# Patient Record
Sex: FEMALE | Race: WHITE | NOT HISPANIC OR LATINO | Employment: FULL TIME | URBAN - NONMETROPOLITAN AREA
[De-identification: names, ages, dates, MRNs, and addresses within clinical notes are randomized per-mention and may not be internally consistent; named-entity substitution may affect disease eponyms.]

---

## 2024-01-02 ENCOUNTER — OFFICE VISIT (OUTPATIENT)
Dept: URGENT CARE | Facility: CLINIC | Age: 39
End: 2024-01-02
Payer: COMMERCIAL

## 2024-01-02 VITALS
WEIGHT: 170 LBS | TEMPERATURE: 98 F | HEART RATE: 72 BPM | BODY MASS INDEX: 25.18 KG/M2 | OXYGEN SATURATION: 99 % | DIASTOLIC BLOOD PRESSURE: 78 MMHG | HEIGHT: 69 IN | RESPIRATION RATE: 16 BRPM | SYSTOLIC BLOOD PRESSURE: 112 MMHG

## 2024-01-02 DIAGNOSIS — J06.9 ACUTE RESPIRATORY DISEASE: Primary | ICD-10-CM

## 2024-01-02 DIAGNOSIS — R05.1 ACUTE COUGH: ICD-10-CM

## 2024-01-02 LAB
SARS-COV-2 AG UPPER RESP QL IA: NEGATIVE
VALID CONTROL: NORMAL

## 2024-01-02 PROCEDURE — 99213 OFFICE O/P EST LOW 20 MIN: CPT | Performed by: PHYSICIAN ASSISTANT

## 2024-01-02 PROCEDURE — 87811 SARS-COV-2 COVID19 W/OPTIC: CPT | Performed by: PHYSICIAN ASSISTANT

## 2024-01-02 RX ORDER — IBUPROFEN 600 MG/1
TABLET ORAL
COMMUNITY
Start: 2023-10-17

## 2024-01-02 RX ORDER — ALBUTEROL SULFATE 90 UG/1
AEROSOL, METERED RESPIRATORY (INHALATION)
COMMUNITY
Start: 2023-08-11

## 2024-01-02 RX ORDER — PANTOPRAZOLE SODIUM 20 MG/1
20 TABLET, DELAYED RELEASE ORAL DAILY
COMMUNITY
Start: 2023-11-04

## 2024-01-02 RX ORDER — FAMOTIDINE 20 MG/1
TABLET, FILM COATED ORAL
COMMUNITY
Start: 2023-11-04

## 2024-01-02 RX ORDER — DEXAMETHASONE 4 MG/1
TABLET ORAL
COMMUNITY

## 2024-01-02 RX ORDER — ESTRADIOL 0.5 MG/1
0.5 TABLET ORAL
COMMUNITY

## 2024-01-02 NOTE — LETTER
January 2, 2024     Patient: Juliana Peoples   YOB: 1985   Date of Visit: 1/2/2024       To Whom It May Concern:    It is my medical opinion that Juliana Peoples may return once symptoms have improved and has remained fever free for 24 hours without fever reducing medications.       If you have any questions or concerns, please don't hesitate to call.         Sincerely,        June Flores PA-C    CC: No Recipients

## 2024-01-02 NOTE — PROGRESS NOTES
[facility]        NAME: Juliana Peoples is a 38 y.o. female  : 1985    MRN: [id number]  DATE: 2024  TIME: 2:22 PM    Assessment and Plan   Acute respiratory disease [J06.9]  1. Acute respiratory disease        2. Acute cough  Poct Covid 19 Rapid Antigen Test            Patient Instructions     Vitamin D3 2000 IU daily  Vitamin C 1000mg twice per day  Multivitamin daily  Some studies suggest that Zinc 12.5-15mg every 2 hours while awake x 5 days may shorten the duration cold symptoms by 1-2 days.   Fluids and rest  Nasal saline spray; Afrin if severe congestion (do not use for more than 3 days)  Over the counter decongestant  Tylenol/Ibuprofen for pain/fever  Salt water gargles and chloraseptic spray  Throat Coat Tea  Warm compresses over sinuses  Steam treatment (utilize proper safety precautions when in contact with hot water/steam)  Follow up with PCP in 3-5 days.  Proceed to  ER if symptoms worsen.    Chief Complaint     Chief Complaint   Patient presents with    Cold Like Symptoms     Sinus congestion, sinus pressure and h/a x days.         History of Present Illness       URI   This is a new problem. The current episode started yesterday. There has been no fever. Associated symptoms include congestion, headaches and rhinorrhea. Pertinent negatives include no abdominal pain, chest pain, coughing, diarrhea, ear pain, nausea, rash, sore throat or vomiting. Treatments tried: sudafed.       Review of Systems   Review of Systems   Constitutional:  Negative for chills and fever.   HENT:  Positive for congestion, postnasal drip, rhinorrhea and sinus pressure. Negative for ear discharge, ear pain, hearing loss, sore throat, tinnitus and trouble swallowing.    Respiratory:  Negative for cough and shortness of breath.    Cardiovascular:  Negative for chest pain and palpitations.   Gastrointestinal:  Negative for abdominal pain, constipation, diarrhea, nausea and vomiting.   Musculoskeletal:   "Positive for myalgias.   Skin:  Negative for rash.   Neurological:  Positive for headaches.         Current Medications       Current Outpatient Medications:     albuterol (PROVENTIL HFA,VENTOLIN HFA) 90 mcg/act inhaler, INHALE 2 PUFFS BY MOUTH AND INTO LUNGS EVERY 6 HOURS AS NEEDED FOR WHEEZING OR COUGH, Disp: , Rfl:     estradiol (ESTRACE) 0.5 MG tablet, Take 0.5 mg by mouth, Disp: , Rfl:     famotidine (PEPCID) 20 mg tablet, take 1 tablet by mouth once daily if needed for HEARTBURN, Disp: , Rfl:     Flovent  MCG/ACT inhaler, INHALE 2 PUFFS BY MOUTH IN THE MORNING AND BEFORE BEDTIME, Disp: , Rfl:     ibuprofen (MOTRIN) 600 mg tablet, take 1 tablet by mouth every 6 hours for 7 days, Disp: , Rfl:     pantoprazole (PROTONIX) 20 mg tablet, Take 20 mg by mouth daily, Disp: , Rfl:     Current Allergies     Allergies as of 01/02/2024 - Reviewed 01/02/2024   Allergen Reaction Noted    Penicillins Angioedema and Anaphylaxis 06/26/2001            The following portions of the patient's history were reviewed and updated as appropriate: allergies, current medications, past family history, past medical history, past social history, past surgical history and problem list.     Past Medical History:   Diagnosis Date    Asthma     GERD (gastroesophageal reflux disease)        Past Surgical History:   Procedure Laterality Date    HYSTERECTOMY      WISDOM TOOTH EXTRACTION         Family History   Problem Relation Age of Onset    No Known Problems Mother          Medications have been verified.        Objective   /78   Pulse 72   Temp 98 °F (36.7 °C)   Resp 16   Ht 5' 9\" (1.753 m)   Wt 77.1 kg (170 lb)   SpO2 99%   BMI 25.10 kg/m²   No LMP recorded. Patient has had a hysterectomy.       Physical Exam     Physical Exam  Vitals reviewed.   Constitutional:       General: She is not in acute distress.     Appearance: She is well-developed. She is not diaphoretic.   HENT:      Head: Normocephalic and atraumatic.      " Right Ear: Tympanic membrane, ear canal and external ear normal.      Left Ear: Tympanic membrane, ear canal and external ear normal.      Nose: Nose normal.      Mouth/Throat:      Pharynx: No oropharyngeal exudate or posterior oropharyngeal erythema.   Eyes:      General:         Right eye: No discharge.         Left eye: No discharge.   Cardiovascular:      Rate and Rhythm: Normal rate and regular rhythm.      Heart sounds: Normal heart sounds. No murmur heard.     No friction rub. No gallop.   Pulmonary:      Effort: Pulmonary effort is normal. No respiratory distress.      Breath sounds: Normal breath sounds. No wheezing, rhonchi or rales.   Lymphadenopathy:      Cervical: No cervical adenopathy.   Skin:     General: Skin is warm.      Findings: No rash.   Neurological:      Mental Status: She is alert.   Psychiatric:         Behavior: Behavior normal.         Thought Content: Thought content normal.         Judgment: Judgment normal.

## 2024-02-27 ENCOUNTER — OFFICE VISIT (OUTPATIENT)
Dept: FAMILY MEDICINE CLINIC | Facility: CLINIC | Age: 39
End: 2024-02-27
Payer: COMMERCIAL

## 2024-02-27 VITALS
OXYGEN SATURATION: 97 % | TEMPERATURE: 97.8 F | SYSTOLIC BLOOD PRESSURE: 100 MMHG | HEART RATE: 81 BPM | HEIGHT: 68 IN | WEIGHT: 190 LBS | DIASTOLIC BLOOD PRESSURE: 60 MMHG | BODY MASS INDEX: 28.79 KG/M2

## 2024-02-27 DIAGNOSIS — G43.909 MIGRAINE WITHOUT STATUS MIGRAINOSUS, NOT INTRACTABLE, UNSPECIFIED MIGRAINE TYPE: ICD-10-CM

## 2024-02-27 DIAGNOSIS — Z13.0 SCREENING FOR DEFICIENCY ANEMIA: ICD-10-CM

## 2024-02-27 DIAGNOSIS — K21.9 GASTROESOPHAGEAL REFLUX DISEASE WITHOUT ESOPHAGITIS: ICD-10-CM

## 2024-02-27 DIAGNOSIS — J45.30 MILD PERSISTENT ASTHMA WITHOUT COMPLICATION: Primary | ICD-10-CM

## 2024-02-27 DIAGNOSIS — Z13.6 SCREENING FOR CARDIOVASCULAR CONDITION: ICD-10-CM

## 2024-02-27 PROCEDURE — 99213 OFFICE O/P EST LOW 20 MIN: CPT

## 2024-02-27 RX ORDER — SUMATRIPTAN 50 MG/1
50 TABLET, FILM COATED ORAL ONCE AS NEEDED
COMMUNITY
End: 2024-02-27

## 2024-02-27 RX ORDER — TOPIRAMATE 25 MG/1
25 CAPSULE ORAL 2 TIMES DAILY
Qty: 30 CAPSULE | Refills: 2 | Status: SHIPPED | OUTPATIENT
Start: 2024-02-27 | End: 2024-02-27

## 2024-02-27 RX ORDER — TOPIRAMATE 25 MG/1
25 CAPSULE ORAL
Qty: 30 CAPSULE | Refills: 2 | Status: SHIPPED | OUTPATIENT
Start: 2024-02-27

## 2024-02-27 NOTE — PROGRESS NOTES
Name: Juliana Peoples      : 1985      MRN: [id number]  Encounter Provider: Jose Ray PA-C  Encounter Date: 2024   Encounter department: [facility]    Assessment & Plan     1. Mild persistent asthma without complication  Assessment & Plan:  Stable.  Continue Flovent daily and albuterol as needed.  Contact office if exacerbation occurs.      2. Migraine without status migrainosus, not intractable, unspecified migraine type  Assessment & Plan:  Patient switched to topiramate 25 mg at bedtime for prophylactic treatment due to sumatriptan hand not working, taking often.  Patient educated on side effects.  Patient is to call office in 1 week to let us know how symptoms progress, and is to let us know if side effects occur.  Patient educated on signs and symptoms of status migrainosus, is to go to the ER if these present.  Can increase 25 mg weekly until symptomatically at right spot.    Orders:  -     topiramate (TOPAMAX) 25 mg sprinkle capsule; Take 1 capsule (25 mg total) by mouth daily at bedtime    3. Gastroesophageal reflux disease without esophagitis  Assessment & Plan:  Stable.  Continue pantoprazole 20 mg daily and famotidine 20 mg daily.  Contact if exacerbation occurs.    Orders:  -     Comprehensive metabolic panel; Future    4. Screening for deficiency anemia  -     Comprehensive metabolic panel; Future  -     CBC and differential; Future    5. Screening for cardiovascular condition  -     Comprehensive metabolic panel; Future  -     Lipid panel; Future           Subjective      Patient is a 38-year-old female presenting to [facility].  Patient was in the emergency department 1 week ago and got a Toradol shot and Benadryl for migraine.  Patient states that sumatriptan did not take the migraine away.    Migraine  This is a chronic problem. The current episode started more than 1 year ago. The problem occurs daily. The problem is unchanged. The pain is present in the  frontal and occipital. The pain quality is similar to prior headaches. The quality of the pain is described as stabbing. The pain is at a severity of 9/10. The pain is severe. Associated symptoms include blurred vision, dizziness, nausea and photophobia. Pertinent negatives include no abdominal pain, abnormal behavior, anorexia, back pain, coughing, diarrhea, drainage, ear pain, eye pain, eye redness, eye watering, facial sweating, fever, hearing loss, insomnia, loss of balance, muscle aches, neck pain, numbness, phonophobia, rhinorrhea, seizures, sinus pressure, sore throat, swollen glands, tingling, tinnitus, visual change, vomiting, weakness or weight loss. (Photophobia.) The symptoms are aggravated by bright light and noise. Past treatments include triptans. The treatment provided mild relief. Her past medical history is significant for migraine headaches and migraines in the family. There is no history of intracranial lesions, obesity, recent head traumas, a seizure disorder, sinus disease or a ventriculoperitoneal shunt.     Review of Systems   Constitutional:  Negative for appetite change, chills, diaphoresis, fatigue, fever and weight loss.   HENT:  Negative for congestion, ear discharge, ear pain, hearing loss, postnasal drip, rhinorrhea, sinus pressure, sinus pain, sneezing, sore throat and tinnitus.    Eyes:  Positive for blurred vision and photophobia. Negative for pain, discharge, redness, itching and visual disturbance.   Respiratory:  Negative for apnea, cough, chest tightness, shortness of breath and wheezing.    Cardiovascular:  Negative for chest pain, palpitations and leg swelling.   Gastrointestinal:  Positive for nausea. Negative for abdominal pain, anorexia, blood in stool, constipation, diarrhea and vomiting.   Endocrine: Negative for cold intolerance, heat intolerance, polydipsia and polyuria.   Genitourinary:  Negative for dysuria, flank pain, frequency, hematuria and urgency.  "  Musculoskeletal:  Negative for arthralgias, back pain, myalgias, neck pain and neck stiffness.   Skin:  Negative for color change and rash.   Allergic/Immunologic: Negative for environmental allergies and food allergies.   Neurological:  Positive for dizziness and headaches. Negative for tingling, tremors, seizures, syncope, speech difficulty, weakness, light-headedness, numbness and loss of balance.   Hematological:  Negative for adenopathy. Does not bruise/bleed easily.   Psychiatric/Behavioral:  Negative for agitation, confusion, decreased concentration, dysphoric mood, hallucinations, self-injury, sleep disturbance and suicidal ideas. The patient is not nervous/anxious, does not have insomnia and is not hyperactive.    All other systems reviewed and are negative.      Current Outpatient Medications on File Prior to Visit   Medication Sig    albuterol (PROVENTIL HFA,VENTOLIN HFA) 90 mcg/act inhaler INHALE 2 PUFFS BY MOUTH AND INTO LUNGS EVERY 6 HOURS AS NEEDED FOR WHEEZING OR COUGH    estradiol (ESTRACE) 0.5 MG tablet Take 0.5 mg by mouth    famotidine (PEPCID) 20 mg tablet take 1 tablet by mouth once daily if needed for HEARTBURN    Flovent  MCG/ACT inhaler INHALE 2 PUFFS BY MOUTH IN THE MORNING AND BEFORE BEDTIME    ibuprofen (MOTRIN) 600 mg tablet take 1 tablet by mouth every 6 hours for 7 days    pantoprazole (PROTONIX) 20 mg tablet Take 20 mg by mouth daily    [DISCONTINUED] SUMAtriptan (Imitrex) 50 mg tablet Take 50 mg by mouth once as needed for migraine       Objective     /60 (BP Location: Right arm, Patient Position: Sitting)   Pulse 81   Temp 97.8 °F (36.6 °C) (Tympanic)   Ht 5' 8\" (1.727 m)   Wt 86.2 kg (190 lb)   SpO2 97%   BMI 28.89 kg/m²     Physical Exam  Vitals and nursing note reviewed.   Constitutional:       Appearance: Normal appearance. She is normal weight. She is not ill-appearing or toxic-appearing.   HENT:      Head: Normocephalic and atraumatic.      Right Ear: " Tympanic membrane normal.      Left Ear: Tympanic membrane normal.      Nose: Nose normal.      Mouth/Throat:      Mouth: Mucous membranes are moist.      Pharynx: Oropharynx is clear.   Eyes:      Extraocular Movements: Extraocular movements intact.      Conjunctiva/sclera: Conjunctivae normal.      Pupils: Pupils are equal, round, and reactive to light.   Cardiovascular:      Rate and Rhythm: Normal rate and regular rhythm.      Pulses: Normal pulses.      Heart sounds: Normal heart sounds. No murmur heard.  Pulmonary:      Effort: Pulmonary effort is normal. No respiratory distress.      Breath sounds: Normal breath sounds. No wheezing.   Chest:      Chest wall: No tenderness.   Abdominal:      General: Bowel sounds are normal.      Palpations: Abdomen is soft. There is no mass.      Tenderness: There is no abdominal tenderness.   Musculoskeletal:         General: No tenderness. Normal range of motion.      Cervical back: Normal range of motion and neck supple. No tenderness.      Right lower leg: No edema.      Left lower leg: No edema.   Lymphadenopathy:      Cervical: No cervical adenopathy.   Skin:     General: Skin is warm.      Capillary Refill: Capillary refill takes less than 2 seconds.      Findings: No erythema, lesion or rash.   Neurological:      General: No focal deficit present.      Mental Status: She is alert and oriented to person, place, and time. Mental status is at baseline.      Cranial Nerves: No cranial nerve deficit.      Sensory: No sensory deficit.      Motor: No weakness.      Coordination: Coordination normal.      Gait: Gait normal.      Deep Tendon Reflexes: Reflexes normal.   Psychiatric:         Mood and Affect: Mood normal.         Behavior: Behavior normal.         Thought Content: Thought content normal.         Judgment: Judgment normal.       Jose Ray PA-C

## 2024-02-27 NOTE — ASSESSMENT & PLAN NOTE
Stable.  Continue pantoprazole 20 mg daily and famotidine 20 mg daily.  Contact if exacerbation occurs.

## 2024-02-27 NOTE — ASSESSMENT & PLAN NOTE
Patient switched to topiramate 25 mg at bedtime for prophylactic treatment due to sumatriptan hand not working, taking often.  Patient educated on side effects.  Patient is to call office in 1 week to let us know how symptoms progress, and is to let us know if side effects occur.  Patient educated on signs and symptoms of status migrainosus, is to go to the ER if these present.  Can increase 25 mg weekly until symptomatically at right spot.

## 2024-03-27 ENCOUNTER — OFFICE VISIT (OUTPATIENT)
Dept: FAMILY MEDICINE CLINIC | Facility: CLINIC | Age: 39
End: 2024-03-27
Payer: COMMERCIAL

## 2024-03-27 VITALS
HEART RATE: 85 BPM | OXYGEN SATURATION: 96 % | WEIGHT: 185 LBS | TEMPERATURE: 98 F | SYSTOLIC BLOOD PRESSURE: 104 MMHG | HEIGHT: 68 IN | DIASTOLIC BLOOD PRESSURE: 70 MMHG | BODY MASS INDEX: 28.04 KG/M2

## 2024-03-27 DIAGNOSIS — K21.9 GASTROESOPHAGEAL REFLUX DISEASE WITHOUT ESOPHAGITIS: ICD-10-CM

## 2024-03-27 DIAGNOSIS — G43.909 MIGRAINE WITHOUT STATUS MIGRAINOSUS, NOT INTRACTABLE, UNSPECIFIED MIGRAINE TYPE: ICD-10-CM

## 2024-03-27 DIAGNOSIS — L98.9 SKIN LESION OF RIGHT ARM: ICD-10-CM

## 2024-03-27 DIAGNOSIS — Z00.00 ANNUAL PHYSICAL EXAM: Primary | ICD-10-CM

## 2024-03-27 DIAGNOSIS — J45.30 MILD PERSISTENT ASTHMA WITHOUT COMPLICATION: ICD-10-CM

## 2024-03-27 DIAGNOSIS — M79.642 PAIN IN BOTH HANDS: ICD-10-CM

## 2024-03-27 DIAGNOSIS — R20.0 NUMBNESS AND TINGLING IN BOTH HANDS: ICD-10-CM

## 2024-03-27 DIAGNOSIS — M79.641 PAIN IN BOTH HANDS: ICD-10-CM

## 2024-03-27 DIAGNOSIS — R20.2 NUMBNESS AND TINGLING IN BOTH HANDS: ICD-10-CM

## 2024-03-27 PROCEDURE — 99395 PREV VISIT EST AGE 18-39: CPT

## 2024-03-27 PROCEDURE — 99214 OFFICE O/P EST MOD 30 MIN: CPT

## 2024-03-27 RX ORDER — KETOCONAZOLE 20 MG/G
CREAM TOPICAL DAILY
Qty: 15 G | Refills: 1 | Status: SHIPPED | OUTPATIENT
Start: 2024-03-27

## 2024-03-27 RX ORDER — IBUPROFEN 600 MG/1
TABLET ORAL
Qty: 60 TABLET | Refills: 1 | Status: SHIPPED | OUTPATIENT
Start: 2024-03-27

## 2024-03-27 NOTE — ASSESSMENT & PLAN NOTE
Will order lab work to investigate underlying causes and x-ray of C-spine.  Educated patient that this could be a neurologic or rheumatologic cause.  Given history and physical exam, does not seem to be vascular.  Patient is to take ibuprofen 600 mg once to twice daily as needed for the pain.

## 2024-03-27 NOTE — PROGRESS NOTES
ADULT ANNUAL PHYSICAL  [facility]    NAME: Juliana Peoples  AGE: 38 y.o. SEX: female  : 1985     DATE: 3/27/2024     Assessment and Plan:     Problem List Items Addressed This Visit          Cardiovascular and Mediastinum    Migraine without status migrainosus, not intractable     Stable on topiramate 25 mg at bedtime for prophylaxis.  Patient states she has not side effects.  Contact office with any exacerbation.         Relevant Medications    ibuprofen (MOTRIN) 600 mg tablet       Respiratory    Mild persistent asthma without complication     Stable.  Continue Flovent daily and albuterol as needed.  Contact office with exacerbation.            Digestive    Gastroesophageal reflux disease without esophagitis     Stable. Continue pantoprazole 20 mg daily and famotidine 20 mg daily. Contact if exacerbation occurs.             Musculoskeletal and Integument    Skin lesion of right arm     Will prescribe ketoconazole 2% cream daily.  Patient is educated on side effects of medication.  Patient is educated may take 2 to 4 weeks to resolve.  Patient is to contact office if any worsening or if no resolution after 4 weeks.         Relevant Medications    ketoconazole (NIZORAL) 2 % cream       Surgery/Wound/Pain    Pain in both hands     Will order lab work to investigate underlying causes and x-ray of C-spine.  Educated patient that this could be a neurologic or rheumatologic cause.  Given history and physical exam, does not seem to be vascular.  Patient is to take ibuprofen 600 mg once to twice daily as needed for the pain.         Relevant Medications    ibuprofen (MOTRIN) 600 mg tablet    Other Relevant Orders    XR spine cervical complete 4 or 5 vw non injury    Vitamin B12    CRUZ Screen w/ Reflex to Titer/Pattern    Sedimentation rate, automated    C-reactive protein    Cyclic citrul peptide antibody, IgG    RF Screen w/ Reflex to Titer        Neurology/Sleep    Numbness and tingling in both hands     Will order lab work to investigate underlying causes and x-ray of C-spine.  Educated patient that this could be a neurologic or rheumatologic cause.  Given history and physical exam, does not seem to be vascular.  Patient is to take ibuprofen 600 mg once to twice daily as needed for the pain.            Relevant Medications    ibuprofen (MOTRIN) 600 mg tablet    Other Relevant Orders    XR spine cervical complete 4 or 5 vw non injury    Vitamin B12    CRUZ Screen w/ Reflex to Titer/Pattern    Sedimentation rate, automated    C-reactive protein    Cyclic citrul peptide antibody, IgG    RF Screen w/ Reflex to Titer     Other Visit Diagnoses       Annual physical exam    -  Primary            Immunizations and preventive care screenings were discussed with patient today. Appropriate education was printed on patient's after visit summary.    Counseling:  Alcohol/drug use: discussed moderation in alcohol intake, the recommendations for healthy alcohol use, and avoidance of illicit drug use.  Dental Health: discussed importance of regular tooth brushing, flossing, and dental visits.  Injury prevention: discussed safety/seat belts, safety helmets, smoke detectors, carbon dioxide detectors, and smoking near bedding or upholstery.  Sexual health: discussed sexually transmitted diseases, partner selection, use of condoms, avoidance of unintended pregnancy, and contraceptive alternatives.  Exercise: the importance of regular exercise/physical activity was discussed. Recommend exercise 3-5 times per week for at least 30 minutes.          Return in about 3 months (around 6/27/2024) for Next scheduled follow up.     Chief Complaint:     Chief Complaint   Patient presents with    Physical Exam    Numbness     Notes that since she started working she has had an increase in numbness in bilateral hands.       History of Present Illness:     Adult Annual Physical   Patient here  for a comprehensive physical exam. The patient reports problems - hands going numb since she was a child.  Usually in legs but has started in hands.  Constantly numb.  Pain accompanies it.  Patient also complains of a small lesion.  It was worse with redness, itching, scaling.  It has gotten somewhat better.  Patient states that it reminded her of the burn but she does not recall burning herself  .    Diet and Physical Activity  Diet/Nutrition: well balanced diet and limited junk food.   Exercise: walking.      Depression Screening  PHQ-2/9 Depression Screening           General Health  Sleep: sleeps well and gets 7-8 hours of sleep on average.   Hearing: normal - bilateral.  Vision: no vision problems.   Dental: regular dental visits.       /GYN Health  Follows with gynecology? no   Last menstrual period: Hysterectomy in 2012  Contraceptive method:  None .  History of STDs?: no.       Review of Systems:     Review of Systems   Constitutional:  Negative for appetite change, chills, diaphoresis, fatigue and fever.   HENT:  Negative for congestion, ear discharge, ear pain, postnasal drip, rhinorrhea, sinus pressure, sinus pain, sneezing and sore throat.    Eyes:  Negative for pain, discharge, redness, itching and visual disturbance.   Respiratory:  Negative for apnea, cough, chest tightness, shortness of breath and wheezing.    Cardiovascular:  Negative for chest pain, palpitations and leg swelling.   Gastrointestinal:  Negative for abdominal pain, blood in stool, constipation, diarrhea, nausea and vomiting.   Endocrine: Negative for cold intolerance, heat intolerance, polydipsia and polyuria.   Genitourinary:  Negative for dysuria, flank pain, frequency, hematuria and urgency.   Musculoskeletal:  Positive for arthralgias. Negative for back pain, myalgias, neck pain and neck stiffness.   Skin:  Positive for rash. Negative for color change.   Allergic/Immunologic: Negative for environmental allergies and food  allergies.   Neurological:  Positive for numbness. Negative for dizziness, tremors, seizures, syncope, speech difficulty, weakness, light-headedness and headaches.   Hematological:  Negative for adenopathy. Does not bruise/bleed easily.   Psychiatric/Behavioral:  Negative for agitation, confusion, decreased concentration, dysphoric mood, hallucinations, self-injury, sleep disturbance and suicidal ideas. The patient is not nervous/anxious and is not hyperactive.    All other systems reviewed and are negative.     Past Medical History:     Past Medical History:   Diagnosis Date    Asthma     GERD (gastroesophageal reflux disease)     Leg dysfunction     numbness, pain    Migraines       Past Surgical History:     Past Surgical History:   Procedure Laterality Date    HYSTERECTOMY      TOOTH EXTRACTION N/A     WISDOM TOOTH EXTRACTION        Social History:     Social History     Socioeconomic History    Marital status: /Civil Union     Spouse name: None    Number of children: None    Years of education: None    Highest education level: None   Occupational History    None   Tobacco Use    Smoking status: Every Day     Current packs/day: 0.50     Types: Cigarettes     Passive exposure: Current    Smokeless tobacco: Never   Vaping Use    Vaping status: Former   Substance and Sexual Activity    Alcohol use: Not Currently    Drug use: Never    Sexual activity: Yes     Partners: Male     Birth control/protection: Female Sterilization   Other Topics Concern    None   Social History Narrative    None     Social Determinants of Health     Financial Resource Strain: Not on file   Food Insecurity: Patient Declined (10/18/2023)    Received from [facility]    Hunger Vital Sign     Worried About Running Out of Food in the Last Year: Patient declined     Ran Out of Food in the Last Year: Patient declined   Transportation Needs: Not on file   Physical Activity: Not on file   Stress: Not on file   Social Connections: Not on file  "  Intimate Partner Violence: Not on file   Housing Stability: Not on file      Family History:     Family History   Problem Relation Age of Onset    Breast cancer Mother     Diabetes Mother     Heart disease Mother     Breast cancer Maternal Grandmother     Diabetes Maternal Grandmother     Heart disease Maternal Grandmother       Current Medications:     Current Outpatient Medications   Medication Sig Dispense Refill    albuterol (PROVENTIL HFA,VENTOLIN HFA) 90 mcg/act inhaler INHALE 2 PUFFS BY MOUTH AND INTO LUNGS EVERY 6 HOURS AS NEEDED FOR WHEEZING OR COUGH      estradiol (ESTRACE) 0.5 MG tablet Take 0.5 mg by mouth      famotidine (PEPCID) 20 mg tablet take 1 tablet by mouth once daily if needed for HEARTBURN      Flovent  MCG/ACT inhaler INHALE 2 PUFFS BY MOUTH IN THE MORNING AND BEFORE BEDTIME      ibuprofen (MOTRIN) 600 mg tablet Take 1 tablet twice daily as needed for pain. 60 tablet 1    ketoconazole (NIZORAL) 2 % cream Apply topically daily 15 g 1    pantoprazole (PROTONIX) 20 mg tablet Take 20 mg by mouth daily      topiramate (TOPAMAX) 25 mg sprinkle capsule Take 1 capsule (25 mg total) by mouth daily at bedtime 30 capsule 2     No current facility-administered medications for this visit.      Allergies:     Allergies   Allergen Reactions    Penicillins Angioedema and Anaphylaxis    Pollen Extract Edema      Physical Exam:     /70 (BP Location: Left arm, Patient Position: Sitting)   Pulse 85   Temp 98 °F (36.7 °C) (Tympanic)   Ht 5' 8\" (1.727 m)   Wt 83.9 kg (185 lb)   SpO2 96%   BMI 28.13 kg/m²     Physical Exam  Vitals and nursing note reviewed.   Constitutional:       General: She is not in acute distress.     Appearance: Normal appearance. She is well-developed and normal weight. She is not ill-appearing, toxic-appearing or diaphoretic.   HENT:      Head: Normocephalic and atraumatic.      Right Ear: Tympanic membrane normal.      Left Ear: Tympanic membrane normal.      Nose: " Nose normal.      Mouth/Throat:      Mouth: Mucous membranes are moist.      Pharynx: Oropharynx is clear.   Eyes:      Conjunctiva/sclera: Conjunctivae normal.      Pupils: Pupils are equal, round, and reactive to light.   Cardiovascular:      Rate and Rhythm: Normal rate and regular rhythm.      Pulses: Normal pulses.      Heart sounds: Normal heart sounds. No murmur heard.  Pulmonary:      Effort: Pulmonary effort is normal. No respiratory distress.      Breath sounds: Normal breath sounds. No wheezing.   Chest:      Chest wall: No tenderness.   Abdominal:      General: Bowel sounds are normal.      Palpations: Abdomen is soft. There is no mass.      Tenderness: There is no abdominal tenderness.   Musculoskeletal:         General: No swelling or tenderness. Normal range of motion.      Cervical back: Normal range of motion and neck supple. No tenderness.      Right lower leg: No edema.      Left lower leg: No edema.   Lymphadenopathy:      Cervical: No cervical adenopathy.   Skin:     General: Skin is warm and dry.      Capillary Refill: Capillary refill takes less than 2 seconds.      Findings: Rash (2 cm diameter erythematous scaling rash with central clearing.  On right arm.) present. No erythema or lesion.   Neurological:      General: No focal deficit present.      Mental Status: She is alert and oriented to person, place, and time. Mental status is at baseline.      Cranial Nerves: No cranial nerve deficit.      Sensory: No sensory deficit.      Motor: No weakness.      Coordination: Coordination normal.      Gait: Gait normal.      Deep Tendon Reflexes: Reflexes normal.   Psychiatric:         Mood and Affect: Mood normal.         Behavior: Behavior normal.         Thought Content: Thought content normal.         Judgment: Judgment normal.          Jose Ray PA-C   [facility]

## 2024-03-27 NOTE — ASSESSMENT & PLAN NOTE
Will prescribe ketoconazole 2% cream daily.  Patient is educated on side effects of medication.  Patient is educated may take 2 to 4 weeks to resolve.  Patient is to contact office if any worsening or if no resolution after 4 weeks.

## 2024-03-27 NOTE — ASSESSMENT & PLAN NOTE
Stable on topiramate 25 mg at bedtime for prophylaxis.  Patient states she has not side effects.  Contact office with any exacerbation.

## 2024-04-22 ENCOUNTER — OFFICE VISIT (OUTPATIENT)
Dept: URGENT CARE | Facility: CLINIC | Age: 39
End: 2024-04-22
Payer: COMMERCIAL

## 2024-04-22 VITALS
SYSTOLIC BLOOD PRESSURE: 107 MMHG | RESPIRATION RATE: 20 BRPM | DIASTOLIC BLOOD PRESSURE: 75 MMHG | WEIGHT: 185.2 LBS | HEART RATE: 69 BPM | BODY MASS INDEX: 28.16 KG/M2 | OXYGEN SATURATION: 100 % | TEMPERATURE: 98 F

## 2024-04-22 DIAGNOSIS — J01.40 ACUTE PANSINUSITIS, RECURRENCE NOT SPECIFIED: Primary | ICD-10-CM

## 2024-04-22 PROCEDURE — 99213 OFFICE O/P EST LOW 20 MIN: CPT

## 2024-04-22 RX ORDER — DOXYCYCLINE 100 MG/1
100 TABLET ORAL 2 TIMES DAILY
Qty: 14 TABLET | Refills: 0 | Status: SHIPPED | OUTPATIENT
Start: 2024-04-22 | End: 2024-04-29

## 2024-04-22 RX ORDER — FLUTICASONE PROPIONATE 50 MCG
1 SPRAY, SUSPENSION (ML) NASAL DAILY
Qty: 11.1 ML | Refills: 0 | Status: SHIPPED | OUTPATIENT
Start: 2024-04-22

## 2024-04-22 NOTE — LETTER
April 22, 2024     Patient: Juliana Peoples   YOB: 1985   Date of Visit: 4/22/2024       To Whom it May Concern:    Juliana Peoples was seen in my clinic on 4/22/2024. She may return to work on 4/23/2024 .    If you have any questions or concerns, please don't hesitate to call.         Sincerely,          Nicho Aguilar PA-C        CC: No Recipients

## 2024-04-22 NOTE — PROGRESS NOTES
[facility]        NAME: Juliana Peoples is a 38 y.o. female  : 1985    MRN: [id number]  DATE: 2024  TIME: 8:54 AM    Assessment and Plan   Acute pansinusitis, recurrence not specified [J01.40]  1. Acute pansinusitis, recurrence not specified  doxycycline (ADOXA) 100 MG tablet    fluticasone (FLONASE) 50 mcg/act nasal spray        3 weeks of upper respiratory congestion and sinus pressure/pain in the maxillary and frontal regions.  No relief with counter medications.  No sick contacts.  Will start on doxycycline and Flonase.  Advised to follow-up on family doctor.  Advised to go to the ER if any symptoms worsen.    Patient Instructions     Take prescribed medication as instructed.  Eat yogurt with live and active cultures and/or take a probiotic at least 3 hours before or after antibiotic dose. Monitor stool for diarrhea and/or blood. If this occurs, contact primary care doctor ASAP.    Doxycycline may cause your skin to be more sensitive to sunlight than it is normally. Exposure to sunlight, even for short periods of time, may cause skin rash, itching, redness or other discoloration of the skin, or a severe sunburn. Practice proper precautions including avoiding excessive sunlight exposure, wear skin protection including clothing and sunscreen to avoid reaction.    Tylenol or Motrin for pain or fever.  May do nasal saline rinses/Cheryl pot.  Follow up with PCP in 3-5 days.  Proceed to  ER if symptoms worsen.    If tests are performed, our office will contact you with results only if changes need to made to the care plan discussed with you at the visit. [facility]    Chief Complaint     Chief Complaint   Patient presents with    Cold Like Symptoms     Runny nose, sore throat and headache, body aches x 3 weeks.  Using allergy and cold meds.           History of Present Illness       38-year-old female presents to the clinic for 3 weeks of upper  respiratory congestion, sinus pressure, sore throat, body aches, headache.  Denies sick contacts.  She has tried over-the-counter cold and needed medications without relief.  Denies any fever, chills, earache, chest pain, shortness of breath abdominal pain, nausea, vomiting, diarrhea        Review of Systems   Review of Systems   Constitutional: Negative.    HENT:  Positive for congestion, sinus pressure and sore throat. Negative for ear discharge and ear pain.    Eyes: Negative.    Respiratory: Negative.  Negative for cough, shortness of breath and wheezing.    Cardiovascular: Negative.    Musculoskeletal:  Positive for myalgias (body ache).   Skin: Negative.    Neurological:  Positive for headaches. Negative for dizziness, syncope, facial asymmetry, weakness, light-headedness and numbness.         Current Medications       Current Outpatient Medications:     albuterol (PROVENTIL HFA,VENTOLIN HFA) 90 mcg/act inhaler, INHALE 2 PUFFS BY MOUTH AND INTO LUNGS EVERY 6 HOURS AS NEEDED FOR WHEEZING OR COUGH, Disp: , Rfl:     doxycycline (ADOXA) 100 MG tablet, Take 1 tablet (100 mg total) by mouth 2 (two) times a day for 7 days, Disp: 14 tablet, Rfl: 0    estradiol (ESTRACE) 0.5 MG tablet, Take 0.5 mg by mouth, Disp: , Rfl:     famotidine (PEPCID) 20 mg tablet, take 1 tablet by mouth once daily if needed for HEARTBURN, Disp: , Rfl:     Flovent  MCG/ACT inhaler, INHALE 2 PUFFS BY MOUTH IN THE MORNING AND BEFORE BEDTIME, Disp: , Rfl:     fluticasone (FLONASE) 50 mcg/act nasal spray, 1 spray into each nostril daily, Disp: 11.1 mL, Rfl: 0    ibuprofen (MOTRIN) 600 mg tablet, Take 1 tablet twice daily as needed for pain., Disp: 60 tablet, Rfl: 1    ketoconazole (NIZORAL) 2 % cream, Apply topically daily, Disp: 15 g, Rfl: 1    pantoprazole (PROTONIX) 20 mg tablet, Take 20 mg by mouth daily, Disp: , Rfl:     topiramate (TOPAMAX) 25 mg sprinkle capsule, Take 1 capsule (25 mg total) by mouth daily at bedtime, Disp: 30  capsule, Rfl: 2    Current Allergies     Allergies as of 04/22/2024 - Reviewed 04/22/2024   Allergen Reaction Noted    Penicillins Angioedema and Anaphylaxis 06/26/2001    Pollen extract Edema 07/28/2023            The following portions of the patient's history were reviewed and updated as appropriate: allergies, current medications, past family history, past medical history, past social history, past surgical history and problem list.     Past Medical History:   Diagnosis Date    Asthma     GERD (gastroesophageal reflux disease)     Leg dysfunction     numbness, pain    Migraines        Past Surgical History:   Procedure Laterality Date    HYSTERECTOMY      TOOTH EXTRACTION N/A     WISDOM TOOTH EXTRACTION         Family History   Problem Relation Age of Onset    Breast cancer Mother     Diabetes Mother     Heart disease Mother     Breast cancer Maternal Grandmother     Diabetes Maternal Grandmother     Heart disease Maternal Grandmother          Medications have been verified.        Objective   /75   Pulse 69   Temp 98 °F (36.7 °C)   Resp 20   Wt 84 kg (185 lb 3.2 oz)   SpO2 100%   BMI 28.16 kg/m²        Physical Exam     Physical Exam  Constitutional:       General: She is not in acute distress.     Appearance: Normal appearance. She is not ill-appearing, toxic-appearing or diaphoretic.   HENT:      Head: Normocephalic and atraumatic.      Right Ear: Tympanic membrane, ear canal and external ear normal.      Left Ear: Tympanic membrane, ear canal and external ear normal.      Nose: Congestion present.      Right Sinus: Maxillary sinus tenderness and frontal sinus tenderness present.      Left Sinus: Maxillary sinus tenderness and frontal sinus tenderness present.      Mouth/Throat:      Mouth: Mucous membranes are moist.      Pharynx: Oropharynx is clear. No oropharyngeal exudate or posterior oropharyngeal erythema.   Eyes:      Extraocular Movements: Extraocular movements intact.      Pupils: Pupils  are equal, round, and reactive to light.   Cardiovascular:      Rate and Rhythm: Normal rate and regular rhythm.      Pulses: Normal pulses.      Heart sounds: Normal heart sounds.   Pulmonary:      Effort: Pulmonary effort is normal. No respiratory distress.      Breath sounds: Normal breath sounds. No stridor. No wheezing, rhonchi or rales.   Chest:      Chest wall: No tenderness.   Musculoskeletal:      Cervical back: Normal range of motion.   Lymphadenopathy:      Cervical: No cervical adenopathy.   Skin:     General: Skin is warm and dry.      Capillary Refill: Capillary refill takes less than 2 seconds.      Coloration: Skin is not pale.      Findings: No erythema or rash.   Neurological:      Mental Status: She is alert and oriented to person, place, and time.   Psychiatric:         Mood and Affect: Mood normal.

## 2024-04-22 NOTE — PATIENT INSTRUCTIONS
Take prescribed medication as instructed.  Eat yogurt with live and active cultures and/or take a probiotic at least 3 hours before or after antibiotic dose. Monitor stool for diarrhea and/or blood. If this occurs, contact primary care doctor ASAP.    Doxycycline may cause your skin to be more sensitive to sunlight than it is normally. Exposure to sunlight, even for short periods of time, may cause skin rash, itching, redness or other discoloration of the skin, or a severe sunburn. Practice proper precautions including avoiding excessive sunlight exposure, wear skin protection including clothing and sunscreen to avoid reaction.    Tylenol or Motrin for pain or fever.  May do nasal saline rinses/[place] pot.  Follow up with PCP in 3-5 days.  Proceed to  ER if symptoms worsen.    If tests are performed, our office will contact you with results only if changes need to made to the care plan discussed with you at the visit. You can review your full results on [facility] Mychart.  Sinusitis   WHAT YOU NEED TO KNOW:   Sinusitis is inflammation or infection of your sinuses. Sinusitis is most often caused by a virus. Acute sinusitis may last up to 12 weeks. Chronic sinusitis lasts longer than 12 weeks. Recurrent sinusitis means you have 4 or more infections in 1 year.        DISCHARGE INSTRUCTIONS:   Return to the emergency department if:   You have trouble breathing or wheezing that is getting worse.    You have a stiff neck, a fever, or a bad headache.     You cannot open your eye.     Your eyeball bulges out or you cannot move your eye.     You are more sleepy than normal, or you notice changes in your ability to think, move, or talk.    You have swelling of your forehead or scalp.    Call your doctor if:   You have vision changes, such as double vision.    Your eye and eyelid are red, swollen, and painful.     Your symptoms do not improve or go away after 10 days.    You have nausea and are vomiting.    Your nose is  bleeding.    You have questions or concerns about your condition or care.    Medicines:  Your symptoms may go away on their own. Your healthcare provider may recommend watchful waiting for up to 10 days before starting antibiotics. You may need any of the following:  Acetaminophen  decreases pain and fever. It is available without a doctor's order. Ask how much to take and how often to take it. Follow directions. Read the labels of all other medicines you are using to see if they also contain acetaminophen, or ask your doctor or pharmacist. Acetaminophen can cause liver damage if not taken correctly.    NSAIDs , such as ibuprofen, help decrease swelling, pain, and fever. This medicine is available with or without a doctor's order. NSAIDs can cause stomach bleeding or kidney problems in certain people. If you take blood thinner medicine, always ask your healthcare provider if NSAIDs are safe for you. Always read the medicine label and follow directions.    Nasal steroid sprays  may help decrease inflammation in your nose and sinuses.    Decongestants  help reduce swelling and drain mucus in the nose and sinuses. They may help you breathe easier.     Antihistamines  help dry mucus in the nose and relieve sneezing.     Antibiotics  help treat or prevent a bacterial infection.    Take your medicine as directed.  Contact your healthcare provider if you think your medicine is not helping or if you have side effects. Tell your provider if you are allergic to any medicine. Keep a list of the medicines, vitamins, and herbs you take. Include the amounts, and when and why you take them. Bring the list or the pill bottles to follow-up visits. Carry your medicine list with you in case of an emergency.    Self-care:   Rinse your sinuses as directed.  Use a sinus rinse device to rinse your nasal passages with a saline (salt water) solution or distilled water. Do not use tap water. This will help thin the mucus in your nose and  rinse away pollen and dirt. It will also help reduce swelling so you can breathe normally.    Use a humidifier  to increase air moisture in your home. This may make it easier for you to breathe and help decrease your cough.     Sleep with your head elevated.  Place an extra pillow under your head before you go to sleep to help your sinuses drain.     Drink liquids as directed.  Ask your healthcare provider how much liquid to drink each day and which liquids are best for you. Liquids will thin the mucus in your nose and help it drain. Avoid drinks that contain alcohol or caffeine.     Do not smoke, and avoid secondhand smoke.  Nicotine and other chemicals in cigarettes and cigars can make your symptoms worse. Ask your healthcare provider for information if you currently smoke and need help to quit. E-cigarettes or smokeless tobacco still contain nicotine. Talk to your healthcare provider before you use these products.    Prevent the spread of germs:   Wash your hands often with soap and water.  Wash your hands after you use the bathroom, change a child's diaper, or sneeze. Wash your hands before you prepare or eat food.         Stay away from people who are sick.  Some germs spread easily and quickly through contact.    Follow up with your doctor as directed:  You may be referred to an ear, nose, and throat specialist. Write down your questions so you remember to ask them during your visits.   © Copyright [employer] 2023 Information is for End User's use only and may not be sold, redistributed or otherwise used for commercial purposes.  The above information is an  only. It is not intended as medical advice for individual conditions or treatments. Talk to your doctor, nurse or pharmacist before following any medical regimen to see if it is safe and effective for you.

## 2024-05-17 DIAGNOSIS — G43.909 MIGRAINE WITHOUT STATUS MIGRAINOSUS, NOT INTRACTABLE, UNSPECIFIED MIGRAINE TYPE: ICD-10-CM

## 2024-05-17 RX ORDER — TOPIRAMATE SPINKLE 25 MG/1
25 CAPSULE ORAL
Qty: 30 CAPSULE | Refills: 5 | Status: SHIPPED | OUTPATIENT
Start: 2024-05-17

## 2024-06-21 ENCOUNTER — APPOINTMENT (OUTPATIENT)
Dept: RADIOLOGY | Facility: CLINIC | Age: 39
End: 2024-06-21
Payer: COMMERCIAL

## 2024-06-21 ENCOUNTER — APPOINTMENT (OUTPATIENT)
Dept: LAB | Facility: CLINIC | Age: 39
End: 2024-06-21
Payer: COMMERCIAL

## 2024-06-21 DIAGNOSIS — R20.0 NUMBNESS AND TINGLING IN BOTH HANDS: ICD-10-CM

## 2024-06-21 DIAGNOSIS — M79.642 PAIN IN BOTH HANDS: ICD-10-CM

## 2024-06-21 DIAGNOSIS — M79.641 PAIN IN BOTH HANDS: ICD-10-CM

## 2024-06-21 DIAGNOSIS — R20.2 NUMBNESS AND TINGLING IN BOTH HANDS: ICD-10-CM

## 2024-06-21 PROCEDURE — 82607 VITAMIN B-12: CPT

## 2024-06-21 PROCEDURE — 85652 RBC SED RATE AUTOMATED: CPT

## 2024-06-21 PROCEDURE — 36415 COLL VENOUS BLD VENIPUNCTURE: CPT

## 2024-06-21 PROCEDURE — 72050 X-RAY EXAM NECK SPINE 4/5VWS: CPT

## 2024-06-21 PROCEDURE — 86200 CCP ANTIBODY: CPT

## 2024-06-21 PROCEDURE — 86140 C-REACTIVE PROTEIN: CPT

## 2024-06-21 PROCEDURE — 86430 RHEUMATOID FACTOR TEST QUAL: CPT

## 2024-06-21 PROCEDURE — 86038 ANTINUCLEAR ANTIBODIES: CPT

## 2024-06-22 LAB
ANA SER QL IA: NEGATIVE
CRP SERPL QL: 1.3 MG/L
ERYTHROCYTE [SEDIMENTATION RATE] IN BLOOD: 6 MM/HOUR (ref 0–19)
VIT B12 SERPL-MCNC: 279 PG/ML (ref 180–914)

## 2024-06-24 LAB — RHEUMATOID FACT SER QL LA: NEGATIVE

## 2024-06-25 LAB — CCP AB SER IA-ACNC: 0.7

## 2024-12-31 ENCOUNTER — OFFICE VISIT (OUTPATIENT)
Dept: URGENT CARE | Facility: CLINIC | Age: 39
End: 2024-12-31
Payer: COMMERCIAL

## 2024-12-31 VITALS
WEIGHT: 173.8 LBS | HEIGHT: 68 IN | RESPIRATION RATE: 16 BRPM | DIASTOLIC BLOOD PRESSURE: 72 MMHG | OXYGEN SATURATION: 98 % | BODY MASS INDEX: 26.34 KG/M2 | HEART RATE: 77 BPM | SYSTOLIC BLOOD PRESSURE: 114 MMHG | TEMPERATURE: 98.5 F

## 2024-12-31 DIAGNOSIS — S61.209A AVULSION OF FINGER, INITIAL ENCOUNTER: Primary | ICD-10-CM

## 2024-12-31 PROCEDURE — 99213 OFFICE O/P EST LOW 20 MIN: CPT

## 2024-12-31 RX ORDER — DOXYCYCLINE HYCLATE 100 MG
100 TABLET ORAL 2 TIMES DAILY
Qty: 14 TABLET | Refills: 0 | Status: SHIPPED | OUTPATIENT
Start: 2024-12-31 | End: 2025-01-07

## 2024-12-31 NOTE — PATIENT INSTRUCTIONS
Keep dressing intact for 24 hours  After 24 hours you may remove dressing and allow 4-6 hours per day open to air.  Clean with mild soap and water.  You have been prescribed an antibiotic.  Take antibiotic as directed for the full duration.  Do not stop the antibiotics just because you are feeling better.  Side effects of antibiotics include diarrhea.  Eat yogurt or take a probiotic or acidophilus tablet while taking this medication to help prevent diarrhea and replenish good gut bacteria.

## 2024-12-31 NOTE — PROGRESS NOTES
[facility]        NAME: Juliana Peoples is a 39 y.o. female  : 1985    MRN: [id number]  DATE: 2024  TIME: 4:13 PM    Assessment and Plan   Avulsion of finger, initial encounter [S61.209A]  1. Avulsion of finger, initial encounter  doxycycline hyclate (VIBRA-TABS) 100 mg tablet      Avulsion of left middle finger. Surgifoam applied with ashwini and coban. Doxycycline to help prevent infection.       Patient Instructions   Keep dressing intact for 24 hours  After 24 hours you may remove dressing and allow 4-6 hours per day open to air.  Clean with mild soap and water.  You have been prescribed an antibiotic.  Take antibiotic as directed for the full duration.  Do not stop the antibiotics just because you are feeling better.  Side effects of antibiotics include diarrhea.  Eat yogurt or take a probiotic or acidophilus tablet while taking this medication to help prevent diarrhea and replenish good gut bacteria.     Follow up with PCP in 3-5 days.  Proceed to  ER if symptoms worsen.    Chief Complaint     Chief Complaint   Patient presents with    Finger Laceration     Cut left middle finger laceration on slicer 1 day ago at 1830  Tetanus 2023  Left middle finger will not stop bleeding         History of Present Illness       Patient is a 39 year old female who presents to the office today for an avulsion to the left middle finger. Sustained while slicing meat last night with the . Reports has not stopped bleeding. Tdap is up to date        Review of Systems   Review of Systems   Skin:  Positive for wound.   All other systems reviewed and are negative.        Current Medications       Current Outpatient Medications:     albuterol (PROVENTIL HFA,VENTOLIN HFA) 90 mcg/act inhaler, INHALE 2 PUFFS BY MOUTH AND INTO LUNGS EVERY 6 HOURS AS NEEDED FOR WHEEZING OR COUGH, Disp: , Rfl:     doxycycline hyclate (VIBRA-TABS) 100 mg tablet, Take 1 tablet (100 mg total) by mouth 2 (two) times a  "day for 7 days, Disp: 14 tablet, Rfl: 0    estradiol (ESTRACE) 0.5 MG tablet, Take 0.5 mg by mouth, Disp: , Rfl:     famotidine (PEPCID) 20 mg tablet, take 1 tablet by mouth once daily if needed for HEARTBURN, Disp: , Rfl:     Flovent  MCG/ACT inhaler, INHALE 2 PUFFS BY MOUTH IN THE MORNING AND BEFORE BEDTIME, Disp: , Rfl:     fluticasone (FLONASE) 50 mcg/act nasal spray, 1 spray into each nostril daily, Disp: 11.1 mL, Rfl: 0    ibuprofen (MOTRIN) 600 mg tablet, Take 1 tablet twice daily as needed for pain., Disp: 60 tablet, Rfl: 1    pantoprazole (PROTONIX) 20 mg tablet, Take 20 mg by mouth daily, Disp: , Rfl:     topiramate (TOPAMAX) 25 mg sprinkle capsule, Take 1 capsule by mouth once daily at bedtime, Disp: 30 capsule, Rfl: 5    ketoconazole (NIZORAL) 2 % cream, Apply topically daily, Disp: 15 g, Rfl: 1    Current Allergies     Allergies as of 12/31/2024 - Reviewed 12/31/2024   Allergen Reaction Noted    Penicillins Angioedema and Anaphylaxis 06/26/2001    Pollen extract Edema 07/28/2023            The following portions of the patient's history were reviewed and updated as appropriate: allergies, current medications, past family history, past medical history, past social history, past surgical history and problem list.     Past Medical History:   Diagnosis Date    Asthma     GERD (gastroesophageal reflux disease)     Leg dysfunction     numbness, pain    Migraines        Past Surgical History:   Procedure Laterality Date    HYSTERECTOMY      TOOTH EXTRACTION N/A     WISDOM TOOTH EXTRACTION         Family History   Problem Relation Age of Onset    Breast cancer Mother     Diabetes Mother     Heart disease Mother     Breast cancer Maternal Grandmother     Diabetes Maternal Grandmother     Heart disease Maternal Grandmother          Medications have been verified.        Objective   /72   Pulse 77   Temp 98.5 °F (36.9 °C)   Resp 16   Ht 5' 8\" (1.727 m)   Wt 78.8 kg (173 lb 12.8 oz)   SpO2 98%   " BMI 26.43 kg/m²        Physical Exam     Physical Exam  Vitals and nursing note reviewed.   Constitutional:       Appearance: Normal appearance. She is normal weight.   Cardiovascular:      Rate and Rhythm: Normal rate.      Pulses: Normal pulses.   Pulmonary:      Effort: Pulmonary effort is normal.   Musculoskeletal:         General: Tenderness present.      Left hand: Laceration present.        Hands:    Skin:     General: Skin is warm.      Capillary Refill: Capillary refill takes less than 2 seconds.   Neurological:      Mental Status: She is alert.

## 2025-03-10 DIAGNOSIS — G43.909 MIGRAINE WITHOUT STATUS MIGRAINOSUS, NOT INTRACTABLE, UNSPECIFIED MIGRAINE TYPE: ICD-10-CM

## 2025-03-10 DIAGNOSIS — R20.0 NUMBNESS AND TINGLING IN BOTH HANDS: ICD-10-CM

## 2025-03-10 DIAGNOSIS — R20.2 NUMBNESS AND TINGLING IN BOTH HANDS: ICD-10-CM

## 2025-03-10 DIAGNOSIS — M79.642 PAIN IN BOTH HANDS: ICD-10-CM

## 2025-03-10 DIAGNOSIS — M79.641 PAIN IN BOTH HANDS: ICD-10-CM

## 2025-03-10 RX ORDER — IBUPROFEN 600 MG/1
TABLET, FILM COATED ORAL
Qty: 60 TABLET | Refills: 1 | Status: SHIPPED | OUTPATIENT
Start: 2025-03-10

## 2025-03-10 RX ORDER — TOPIRAMATE SPINKLE 25 MG/1
25 CAPSULE ORAL
Qty: 30 CAPSULE | Refills: 5 | Status: SHIPPED | OUTPATIENT
Start: 2025-03-10

## 2025-03-10 NOTE — TELEPHONE ENCOUNTER
Medication:  topiramate (TOPAMAX) 25 mg sprinkle capsule    Dose/Frequency: Take 1 capsule by mouth once daily at bedtime    Quantity: 30 capsule     Pharmacy: [id number] - [place], [place] - [address] [phone]    Office:   [x] PCP/Provider - Jose Ray PA-C   [] Speciality/Provider -     Does the patient have enough for 3 days?   [] Yes   [x] No - Send as HP to POD           Medication:  ibuprofen (MOTRIN) 600 mg tablet    Dose/Frequency: Take 1 tablet twice daily as needed for pain.     Quantity: 60 tablet     Pharmacy: [id number] - [place], [place] - [address] [phone]    Office:   [x] PCP/Provider - Jose Ray PA-C   [] Speciality/Provider -     Does the patient have enough for 3 days?   [] Yes   [x] No - Send as HP to POD

## 2025-04-02 ENCOUNTER — OFFICE VISIT (OUTPATIENT)
Dept: FAMILY MEDICINE CLINIC | Facility: CLINIC | Age: 40
End: 2025-04-02
Payer: COMMERCIAL

## 2025-04-02 VITALS
DIASTOLIC BLOOD PRESSURE: 84 MMHG | WEIGHT: 178 LBS | TEMPERATURE: 98 F | BODY MASS INDEX: 26.98 KG/M2 | HEIGHT: 68 IN | OXYGEN SATURATION: 99 % | HEART RATE: 73 BPM | SYSTOLIC BLOOD PRESSURE: 110 MMHG

## 2025-04-02 DIAGNOSIS — J45.30 MILD PERSISTENT ASTHMA WITHOUT COMPLICATION: ICD-10-CM

## 2025-04-02 DIAGNOSIS — Z13.6 SCREENING FOR CARDIOVASCULAR CONDITION: ICD-10-CM

## 2025-04-02 DIAGNOSIS — L84 CALLUS OF FOOT: ICD-10-CM

## 2025-04-02 DIAGNOSIS — Z00.00 ANNUAL PHYSICAL EXAM: Primary | ICD-10-CM

## 2025-04-02 DIAGNOSIS — G43.909 MIGRAINE WITHOUT STATUS MIGRAINOSUS, NOT INTRACTABLE, UNSPECIFIED MIGRAINE TYPE: ICD-10-CM

## 2025-04-02 DIAGNOSIS — S99.922A INJURY OF TOE ON LEFT FOOT, INITIAL ENCOUNTER: ICD-10-CM

## 2025-04-02 DIAGNOSIS — K21.9 GASTROESOPHAGEAL REFLUX DISEASE WITHOUT ESOPHAGITIS: ICD-10-CM

## 2025-04-02 DIAGNOSIS — G93.9 BRAIN LESION: ICD-10-CM

## 2025-04-02 PROCEDURE — 99214 OFFICE O/P EST MOD 30 MIN: CPT

## 2025-04-02 PROCEDURE — 99395 PREV VISIT EST AGE 18-39: CPT

## 2025-04-02 RX ORDER — TOPIRAMATE 25 MG/1
25 TABLET, FILM COATED ORAL 2 TIMES DAILY
Qty: 60 TABLET | Refills: 5 | Status: SHIPPED | OUTPATIENT
Start: 2025-04-02

## 2025-04-02 NOTE — LETTER
April 2, 2025     Patient: Juliana Peoples  YOB: 1985  Date of Visit: 4/2/2025      To Whom it May Concern:    Juliana Peoples is under my professional care. Juliana was seen in my office on 4/2/2025. Juliana may return to work on 4/2/25 .    If you have any questions or concerns, please don't hesitate to call.         Sincerely,          Jose Ray PA-C        CC: No Recipients

## 2025-04-02 NOTE — ASSESSMENT & PLAN NOTE
Will get repeat MRI.  Had stable brain lesion on MRI 3 years ago when compared to 2018.  Has been having worsening migraines.  Will increase Topamax to 25 mg twice daily.  Contact office in 2 weeks with symptom progression.  If needed will increase dosing in 4 weeks.  Will follow-up pending imaging results.  Educated on side effects of Topamax and is to contact office if these present.  Orders:    MRI brain w wo contrast; Future    topiramate (Topamax) 25 mg tablet; Take 1 tablet (25 mg total) by mouth 2 (two) times a day

## 2025-04-02 NOTE — PROGRESS NOTES
Adult Annual Physical  Name: Juliana Peoples      : 1985      MRN: [id number]  Encounter Provider: Jose Ray PA-C  Encounter Date: 2025   Encounter department: [facility]    :  Assessment & Plan  Annual physical exam         Mild persistent asthma without complication  Not in acute exacerbation today.  Continue albuterol and Flovent.  Contact office if exacerbation occurs.       Migraine without status migrainosus, not intractable, unspecified migraine type  Will get repeat MRI.  Had stable brain lesion on MRI 3 years ago when compared to 2018.  Has been having worsening migraines.  Will increase Topamax to 25 mg twice daily.  Contact office in 2 weeks with symptom progression.  If needed will increase dosing in 4 weeks.  Will follow-up pending imaging results.  Educated on side effects of Topamax and is to contact office if these present.  Orders:    MRI brain w wo contrast; Future    topiramate (Topamax) 25 mg tablet; Take 1 tablet (25 mg total) by mouth 2 (two) times a day    Gastroesophageal reflux disease without esophagitis  Continue pantoprazole.  No red flag signs/symptoms today.  Contact office if any worsening.       Brain lesion  Will get repeat MRI.  Had stable brain lesion on MRI 3 years ago when compared to 2018.  Has been having worsening migraines.  Will increase Topamax to 25 mg twice daily.  Contact office in 2 weeks with symptom progression.  If needed will increase dosing in 4 weeks.  Will follow-up pending imaging results.  Educated on side effects of Topamax and is to contact office if these present.  Orders:    MRI brain w wo contrast; Future    Screening for cardiovascular condition    Orders:    Lipid panel; Future    BMI 27.0-27.9,adult  Educated on healthy diet and activity.  Orders:    CBC and differential; Future    Comprehensive metabolic panel; Future    Injury of toe on left foot, initial encounter  Will get x-ray of left foot and refer to  "podiatry.  Orders:    Ambulatory Referral to Podiatry; Future    XR foot 3+ vw left; Future    Callus of foot  Will get x-ray of left foot and refer to podiatry.           Preventive Screenings:  - Diabetes Screening: risks/benefits discussed and orders placed  - Cholesterol Screening: risks/benefits discussed and orders placed   - Colon cancer screening: screening not indicated   - Lung cancer screening: screening not indicated     Immunizations:  - Immunizations due: Influenza  - Risks/benefits immunizations discussed      Counseling/Anticipatory Guidance:  - Alcohol: discussed moderation in alcohol intake and recommendations for healthy alcohol use.   - Drug use: discussed harms of illicit drug use and how it can negatively impact mental/physical health.   - Tobacco use: discussed harms of tobacco use and management options for quitting.   - Dental health: discussed importance of regular tooth brushing, flossing, and dental visits.   - Sexual health: discussed sexually transmitted diseases, partner selection, use of condoms, avoidance of unintended pregnancy, and contraceptive alternatives.   - Diet: discussed recommendations for a healthy/well-balanced diet.   - Exercise: the importance of regular exercise/physical activity was discussed. Recommend exercise 3-5 times per week for at least 30 minutes.   - Injury prevention: discussed safety/seat belts, safety helmets, smoke detectors, carbon monoxide detectors, and smoking near bedding or upholstery.          History of Present Illness     Adult Annual Physical:  Patient presents for annual physical. Patient is a 39-year-old female presenting for annual physical.  Patient has been getting worsening migraines.  Describes these as \"spasms\" of the left side of her head and then will disperse across head.  States Topamax does decrease intensity and frequency, but still will get these.  Has history of chronic white matter changes on brain MRI last done 3 years ago.  " Also had brain lesion stable 3 years ago from 2018.  This was benign appearing.  Patient also has calluses on her feet.  States that she hit her left fourth toe at work a few months ago.  Has been bandaging it to the other 1.  Does have pain there.  Stated it was crooked..     Diet and Physical Activity:  - Diet/Nutrition: well balanced diet.  - Exercise: walking.    Depression Screening:  - PHQ-2 Score: 0    General Health:  - Sleep: sleeps well and 4-6 hours of sleep on average.  - Hearing: normal hearing bilateral ears.  - Vision: no vision problems.  - Dental: no dental visits for > 1 year and brushes teeth twice daily. Recommend prophylactic appt 2x year    /GYN Health:  - Follows with GYN: yes.   - History of STDs: no    Review of Systems   Constitutional:  Negative for appetite change, chills, diaphoresis, fatigue and fever.   HENT:  Negative for congestion, ear discharge, ear pain, postnasal drip, rhinorrhea, sinus pressure, sinus pain, sneezing and sore throat.    Eyes:  Negative for pain, discharge, redness, itching and visual disturbance.   Respiratory:  Negative for apnea, cough, chest tightness, shortness of breath and wheezing.    Cardiovascular:  Negative for chest pain, palpitations and leg swelling.   Gastrointestinal:  Negative for abdominal pain, blood in stool, constipation, diarrhea, nausea and vomiting.   Endocrine: Negative for cold intolerance, heat intolerance, polydipsia and polyuria.   Genitourinary:  Negative for dysuria, flank pain, frequency, hematuria and urgency.   Musculoskeletal:  Positive for arthralgias. Negative for back pain, myalgias, neck pain and neck stiffness.   Skin:  Positive for wound. Negative for color change and rash.   Allergic/Immunologic: Negative for environmental allergies and food allergies.   Neurological:  Positive for headaches. Negative for dizziness, tremors, seizures, syncope, speech difficulty, weakness, light-headedness and numbness.   Hematological:   "Negative for adenopathy. Does not bruise/bleed easily.   Psychiatric/Behavioral:  Negative for agitation, confusion, decreased concentration, dysphoric mood, hallucinations, self-injury, sleep disturbance and suicidal ideas. The patient is not nervous/anxious and is not hyperactive.    All other systems reviewed and are negative.    Medical History Reviewed by provider this encounter:     .  Current Outpatient Medications on File Prior to Visit   Medication Sig Dispense Refill    albuterol (PROVENTIL HFA,VENTOLIN HFA) 90 mcg/act inhaler INHALE 2 PUFFS BY MOUTH AND INTO LUNGS EVERY 6 HOURS AS NEEDED FOR WHEEZING OR COUGH      estradiol (ESTRACE) 0.5 MG tablet Take 0.5 mg by mouth      famotidine (PEPCID) 20 mg tablet take 1 tablet by mouth once daily if needed for HEARTBURN      Flovent  MCG/ACT inhaler INHALE 2 PUFFS BY MOUTH IN THE MORNING AND BEFORE BEDTIME      fluticasone (FLONASE) 50 mcg/act nasal spray 1 spray into each nostril daily 11.1 mL 0    ibuprofen (MOTRIN) 600 mg tablet Take 1 tablet twice daily as needed for pain. 60 tablet 1    ketoconazole (NIZORAL) 2 % cream Apply topically daily 15 g 1    pantoprazole (PROTONIX) 20 mg tablet Take 20 mg by mouth daily      [DISCONTINUED] topiramate (TOPAMAX) 25 mg sprinkle capsule Take 1 capsule (25 mg total) by mouth daily at bedtime 30 capsule 5     No current facility-administered medications on file prior to visit.      Social History     Tobacco Use    Smoking status: Every Day     Current packs/day: 0.50     Types: Cigarettes     Passive exposure: Current    Smokeless tobacco: Never   Vaping Use    Vaping status: Former   Substance and Sexual Activity    Alcohol use: Not Currently    Drug use: Never    Sexual activity: Yes     Partners: Male     Birth control/protection: Female Sterilization       Objective   /84 (BP Location: Left arm, Patient Position: Sitting)   Pulse 73   Temp 98 °F (36.7 °C) (Tympanic)   Ht 5' 8\" (1.727 m)   Wt 80.7 kg " (178 lb)   SpO2 99%   BMI 27.06 kg/m²     Physical Exam  Vitals and nursing note reviewed.   Constitutional:       General: She is not in acute distress.     Appearance: Normal appearance. She is well-developed and normal weight. She is not ill-appearing, toxic-appearing or diaphoretic.   HENT:      Head: Normocephalic and atraumatic.      Right Ear: Tympanic membrane normal.      Left Ear: Tympanic membrane normal.      Nose: Nose normal.      Mouth/Throat:      Mouth: Mucous membranes are moist.      Pharynx: Oropharynx is clear.   Eyes:      Conjunctiva/sclera: Conjunctivae normal.      Pupils: Pupils are equal, round, and reactive to light.   Neck:      Vascular: No carotid bruit.   Cardiovascular:      Rate and Rhythm: Normal rate and regular rhythm.      Pulses: Normal pulses.      Heart sounds: Normal heart sounds. No murmur heard.  Pulmonary:      Effort: Pulmonary effort is normal. No respiratory distress.      Breath sounds: Normal breath sounds. No wheezing.   Chest:      Chest wall: No tenderness.   Abdominal:      General: Bowel sounds are normal.      Palpations: Abdomen is soft. There is no mass.      Tenderness: There is no abdominal tenderness.   Musculoskeletal:         General: No swelling, tenderness or deformity (No deformity of toe noted on inspection).      Cervical back: Normal range of motion and neck supple. No tenderness.      Right lower leg: No edema.      Left lower leg: No edema.   Lymphadenopathy:      Cervical: No cervical adenopathy.   Skin:     General: Skin is warm and dry.      Capillary Refill: Capillary refill takes less than 2 seconds.      Findings: Lesion (Callus appearing lesions on soles of feet) present. No erythema or rash.   Neurological:      General: No focal deficit present.      Mental Status: She is alert and oriented to person, place, and time. Mental status is at baseline.      Motor: No weakness.      Coordination: Coordination normal.      Gait: Gait normal.    Psychiatric:         Mood and Affect: Mood normal.         Behavior: Behavior normal.         Thought Content: Thought content normal.         Judgment: Judgment normal.

## 2025-04-02 NOTE — ASSESSMENT & PLAN NOTE
Not in acute exacerbation today.  Continue albuterol and Flovent.  Contact office if exacerbation occurs.

## 2025-04-16 ENCOUNTER — APPOINTMENT (OUTPATIENT)
Dept: LAB | Facility: CLINIC | Age: 40
End: 2025-04-16
Payer: COMMERCIAL

## 2025-04-16 DIAGNOSIS — Z13.6 SCREENING FOR CARDIOVASCULAR CONDITION: ICD-10-CM

## 2025-04-16 LAB
BASOPHILS # BLD AUTO: 0.03 THOUSANDS/ÂΜL (ref 0–0.1)
BASOPHILS NFR BLD AUTO: 1 % (ref 0–1)
EOSINOPHIL # BLD AUTO: 0.16 THOUSAND/ÂΜL (ref 0–0.61)
EOSINOPHIL NFR BLD AUTO: 3 % (ref 0–6)
ERYTHROCYTE [DISTWIDTH] IN BLOOD BY AUTOMATED COUNT: 12.3 % (ref 11.6–15.1)
HCT VFR BLD AUTO: 41.9 % (ref 34.8–46.1)
HGB BLD-MCNC: 13.8 G/DL (ref 11.5–15.4)
IMM GRANULOCYTES # BLD AUTO: 0.01 THOUSAND/UL (ref 0–0.2)
IMM GRANULOCYTES NFR BLD AUTO: 0 % (ref 0–2)
LYMPHOCYTES # BLD AUTO: 2.8 THOUSANDS/ÂΜL (ref 0.6–4.47)
LYMPHOCYTES NFR BLD AUTO: 43 % (ref 14–44)
MCH RBC QN AUTO: 31.2 PG (ref 26.8–34.3)
MCHC RBC AUTO-ENTMCNC: 32.9 G/DL (ref 31.4–37.4)
MCV RBC AUTO: 95 FL (ref 82–98)
MONOCYTES # BLD AUTO: 0.34 THOUSAND/ÂΜL (ref 0.17–1.22)
MONOCYTES NFR BLD AUTO: 5 % (ref 4–12)
NEUTROPHILS # BLD AUTO: 3.11 THOUSANDS/ÂΜL (ref 1.85–7.62)
NEUTS SEG NFR BLD AUTO: 48 % (ref 43–75)
NRBC BLD AUTO-RTO: 0 /100 WBCS
PLATELET # BLD AUTO: 171 THOUSANDS/UL (ref 149–390)
PMV BLD AUTO: 10.3 FL (ref 8.9–12.7)
RBC # BLD AUTO: 4.42 MILLION/UL (ref 3.81–5.12)
WBC # BLD AUTO: 6.45 THOUSAND/UL (ref 4.31–10.16)

## 2025-04-16 PROCEDURE — 80053 COMPREHEN METABOLIC PANEL: CPT

## 2025-04-16 PROCEDURE — 80061 LIPID PANEL: CPT

## 2025-04-16 PROCEDURE — 36415 COLL VENOUS BLD VENIPUNCTURE: CPT

## 2025-04-16 PROCEDURE — 85025 COMPLETE CBC W/AUTO DIFF WBC: CPT

## 2025-04-17 ENCOUNTER — RESULTS FOLLOW-UP (OUTPATIENT)
Dept: FAMILY MEDICINE CLINIC | Facility: CLINIC | Age: 40
End: 2025-04-17

## 2025-04-17 LAB
ALBUMIN SERPL BCG-MCNC: 4.5 G/DL (ref 3.5–5)
ALP SERPL-CCNC: 59 U/L (ref 34–104)
ALT SERPL W P-5'-P-CCNC: 19 U/L (ref 7–52)
ANION GAP SERPL CALCULATED.3IONS-SCNC: 13 MMOL/L (ref 4–13)
AST SERPL W P-5'-P-CCNC: 18 U/L (ref 13–39)
BILIRUB SERPL-MCNC: 0.45 MG/DL (ref 0.2–1)
BUN SERPL-MCNC: 12 MG/DL (ref 5–25)
CALCIUM SERPL-MCNC: 9.4 MG/DL (ref 8.4–10.2)
CHLORIDE SERPL-SCNC: 108 MMOL/L (ref 96–108)
CHOLEST SERPL-MCNC: 165 MG/DL (ref ?–200)
CO2 SERPL-SCNC: 23 MMOL/L (ref 21–32)
CREAT SERPL-MCNC: 0.76 MG/DL (ref 0.6–1.3)
GFR SERPL CREATININE-BSD FRML MDRD: 100 ML/MIN/1.73SQ M
GLUCOSE SERPL-MCNC: 97 MG/DL (ref 65–140)
HDLC SERPL-MCNC: 56 MG/DL
LDLC SERPL CALC-MCNC: 81 MG/DL (ref 0–100)
NONHDLC SERPL-MCNC: 109 MG/DL
POTASSIUM SERPL-SCNC: 3.8 MMOL/L (ref 3.5–5.3)
PROT SERPL-MCNC: 6.8 G/DL (ref 6.4–8.4)
SODIUM SERPL-SCNC: 144 MMOL/L (ref 135–147)
TRIGL SERPL-MCNC: 141 MG/DL (ref ?–150)

## 2025-04-18 ENCOUNTER — OFFICE VISIT (OUTPATIENT)
Dept: PODIATRY | Facility: CLINIC | Age: 40
End: 2025-04-18
Payer: COMMERCIAL

## 2025-04-18 VITALS
WEIGHT: 178 LBS | HEART RATE: 107 BPM | RESPIRATION RATE: 16 BRPM | HEIGHT: 68 IN | TEMPERATURE: 97.8 F | BODY MASS INDEX: 26.98 KG/M2 | OXYGEN SATURATION: 98 %

## 2025-04-18 DIAGNOSIS — L84 CALLUS OF FOOT: Primary | ICD-10-CM

## 2025-04-18 DIAGNOSIS — Q82.8 POROKERATOSIS: ICD-10-CM

## 2025-04-18 PROCEDURE — 99203 OFFICE O/P NEW LOW 30 MIN: CPT | Performed by: PODIATRIST

## 2025-04-18 PROCEDURE — 11055 PARING/CUTG B9 HYPRKER LES 1: CPT | Performed by: PODIATRIST

## 2025-04-18 RX ORDER — UREA 40 %
CREAM (GRAM) TOPICAL DAILY
Qty: 120 G | Refills: 1 | Status: SHIPPED | OUTPATIENT
Start: 2025-04-18

## 2025-04-18 NOTE — PROGRESS NOTES
"Name: Juliana Peoples      : 1985      MRN: [id number]  Encounter Provider: Eva Gilliam DPM  Encounter Date: 2025   Encounter department: [facility]  :  Assessment & Plan  Porokeratosis    Orders:    Ambulatory Referral to Podiatry    Callus of foot    Orders:    urea (CARMOL) 40 %; Apply topically daily           IMPRESSION:  Left foot pain  Callus/PK left submet 5 and plantar medial heel    PLAN:  Patient counseled on calluses and porokeratosis.  Conservative management reviewed including use of keratolytic moisturizers, use of pumice stone, offloading pads, and varying her shoe gear  Lesions debrided using #15 blade and pickup and excised down to healthy skin  Offloading pad provided  Rx urea 40% for application to hyperkeratotic areas  PCP note from 2025 reviewed   Patient was counseled that if lesions recur with frequency we can consider Cantharone treatment for further excision  Follow-up as needed      Lesion Destruction    Date/Time: 2025 2:00 PM    Performed by: Eva Gilliam DPM  Authorized by: Eva Gilliam DPM  Universal Protocol:  procedure performed by consultantConsent: Verbal consent obtained.  Risks and benefits: risks, benefits and alternatives were discussed  Consent given by: patient  Time out: Immediately prior to procedure a \"time out\" was called to verify the correct patient, procedure, equipment, support staff and site/side marked as required.  Patient understanding: patient states understanding of the procedure being performed  Patient identity confirmed: verbally with patient    Procedure Details - Lesion Destruction:     Number of Lesions:  1  Lesion 1:     Body area:  Lower extremity    Lower extremity location:  L foot    Initial size (mm):  25    Final defect size (mm):  1    Malignancy: benign hyperkeratotic lesion      Destruction method: scissors used for extraction          History of Present Illness   HPI  Juliana Peoples is a 39 " "y.o. female who presents for evaluation management of painful lesions on her left foot.  Patient states has been there for many months and they have been increasing in pain.  She denies any redness, swelling, drainage from the lesions.  She denies stepping on anything and possibility of foreign body.  Patient states she uses moisturizer regularly but lesions have not improved.      Review of Systems   Constitutional:  Negative for chills and fever.   Respiratory:  Negative for shortness of breath.    Cardiovascular:  Negative for leg swelling.   Musculoskeletal:  Negative for arthralgias.   Skin:  Negative for wound.          Objective   Pulse (!) 107   Temp 97.8 °F (36.6 °C)   Resp 16   Ht 5' 8\" (1.727 m)   Wt 80.7 kg (178 lb)   SpO2 98%   BMI 27.06 kg/m²      Physical Exam  Constitutional:       General: She is not in acute distress.     Appearance: She is not ill-appearing.   Cardiovascular:      Pulses: Normal pulses.   Musculoskeletal:         General: Tenderness present.   Skin:     Capillary Refill: Capillary refill takes less than 2 seconds.      Comments: Left foot hyperkeratotic lesions with hard white central core indicative of porokeratosis.  Located proximal submet 5, and plantar medial heel.  Small superficial areas noted over other areas of foot.  Also noted on the plantar medial aspect of great toe however patient declined evaluation  No open wounds.  No erythema, no edema, no crepitus, no fluctuance   Neurological:      Sensory: No sensory deficit.           "